# Patient Record
Sex: FEMALE | URBAN - METROPOLITAN AREA
[De-identification: names, ages, dates, MRNs, and addresses within clinical notes are randomized per-mention and may not be internally consistent; named-entity substitution may affect disease eponyms.]

---

## 2019-06-17 ENCOUNTER — APPOINTMENT (RX ONLY)
Dept: URBAN - METROPOLITAN AREA CLINIC 112 | Facility: CLINIC | Age: 29
Setting detail: DERMATOLOGY
End: 2019-06-17

## 2019-06-17 DIAGNOSIS — L70.0 ACNE VULGARIS: ICD-10-CM

## 2019-06-17 PROCEDURE — ? PRESCRIPTION

## 2019-06-17 PROCEDURE — ? COUNSELING

## 2019-06-17 PROCEDURE — ? TREATMENT REGIMEN

## 2019-06-17 PROCEDURE — 99202 OFFICE O/P NEW SF 15 MIN: CPT

## 2019-06-17 RX ORDER — MINOCYCLINE HYDROCHLORIDE 65 MG/1
TABLET, FILM COATED, EXTENDED RELEASE ORAL
Qty: 30 | Refills: 0 | Status: ERX | COMMUNITY
Start: 2019-06-17

## 2019-06-17 RX ADMIN — MINOCYCLINE HYDROCHLORIDE: 65 TABLET, FILM COATED, EXTENDED RELEASE ORAL at 00:00

## 2019-06-17 ASSESSMENT — LOCATION SIMPLE DESCRIPTION DERM: LOCATION SIMPLE: LEFT CHEEK

## 2019-06-17 ASSESSMENT — LOCATION DETAILED DESCRIPTION DERM: LOCATION DETAILED: LEFT INFERIOR MEDIAL MALAR CHEEK

## 2019-06-17 ASSESSMENT — LOCATION ZONE DERM: LOCATION ZONE: FACE

## 2019-06-17 NOTE — PROCEDURE: COUNSELING
Bactrim Counseling:  I discussed with the patient the risks of sulfa antibiotics including but not limited to GI upset, allergic reaction, drug rash, diarrhea, dizziness, photosensitivity, and yeast infections.  Rarely, more serious reactions can occur including but not limited to aplastic anemia, agranulocytosis, methemoglobinemia, blood dyscrasias, liver or kidney failure, lung infiltrates or desquamative/blistering drug rashes.
Minocycline Counseling: Patient advised regarding possible photosensitivity and discoloration of the teeth, skin, lips, tongue and gums.  Patient instructed to avoid sunlight, if possible.  When exposed to sunlight, patients should wear protective clothing, sunglasses, and sunscreen.  The patient was instructed to call the office immediately if the following severe adverse effects occur:  hearing changes, easy bruising/bleeding, severe headache, or vision changes.  The patient verbalized understanding of the proper use and possible adverse effects of minocycline.  All of the patient's questions and concerns were addressed.
Topical Sulfur Applications Counseling: Topical Sulfur Counseling: Patient counseled that this medication may cause skin irritation or allergic reactions.  In the event of skin irritation, the patient was advised to reduce the amount of the drug applied or use it less frequently.   The patient verbalized understanding of the proper use and possible adverse effects of topical sulfur application.  All of the patient's questions and concerns were addressed.
Erythromycin Pregnancy And Lactation Text: This medication is Pregnancy Category B and is considered safe during pregnancy. It is also excreted in breast milk.
Topical Retinoid Pregnancy And Lactation Text: This medication is Pregnancy Category C. It is unknown if this medication is excreted in breast milk.
Tetracycline Counseling: Patient counseled regarding possible photosensitivity and increased risk for sunburn.  Patient instructed to avoid sunlight, if possible.  When exposed to sunlight, patients should wear protective clothing, sunglasses, and sunscreen.  The patient was instructed to call the office immediately if the following severe adverse effects occur:  hearing changes, easy bruising/bleeding, severe headache, or vision changes.  The patient verbalized understanding of the proper use and possible adverse effects of tetracycline.  All of the patient's questions and concerns were addressed. Patient understands to avoid pregnancy while on therapy due to potential birth defects.
Isotretinoin Counseling: Patient should get monthly blood tests, not donate blood, not drive at night if vision affected, not share medication, and not undergo elective surgery for 6 months after tx completed. Side effects reviewed, pt to contact office should one occur.
Tazorac Counseling:  Patient advised that medication is irritating and drying.  Patient may need to apply sparingly and wash off after an hour before eventually leaving it on overnight.  The patient verbalized understanding of the proper use and possible adverse effects of tazorac.  All of the patient's questions and concerns were addressed.
Dapsone Pregnancy And Lactation Text: This medication is Pregnancy Category C and is not considered safe during pregnancy or breast feeding.
Tetracycline Pregnancy And Lactation Text: This medication is Pregnancy Category D and not consider safe during pregnancy. It is also excreted in breast milk.
Bactrim Pregnancy And Lactation Text: This medication is Pregnancy Category D and is known to cause fetal risk.  It is also excreted in breast milk.
Topical Sulfur Applications Pregnancy And Lactation Text: This medication is Pregnancy Category C and has an unknown safety profile during pregnancy. It is unknown if this topical medication is excreted in breast milk.
Birth Control Pills Counseling: Birth Control Pill Counseling: I discussed with the patient the potential side effects of OCPs including but not limited to increased risk of stroke, heart attack, thrombophlebitis, deep venous thrombosis, hepatic adenomas, breast changes, GI upset, headaches, and depression.  The patient verbalized understanding of the proper use and possible adverse effects of OCPs. All of the patient's questions and concerns were addressed.
Include Pregnancy/Lactation Warning?: No
Spironolactone Counseling: Patient advised regarding risks of diarrhea, abdominal pain, hyperkalemia, birth defects (for female patients), liver toxicity and renal toxicity. The patient may need blood work to monitor liver and kidney function and potassium levels while on therapy. The patient verbalized understanding of the proper use and possible adverse effects of spironolactone.  All of the patient's questions and concerns were addressed.
Isotretinoin Pregnancy And Lactation Text: This medication is Pregnancy Category X and is considered extremely dangerous during pregnancy. It is unknown if it is excreted in breast milk.
Tazorac Pregnancy And Lactation Text: This medication is not safe during pregnancy. It is unknown if this medication is excreted in breast milk.
Benzoyl Peroxide Counseling: Patient counseled that medicine may cause skin irritation and bleach clothing.  In the event of skin irritation, the patient was advised to reduce the amount of the drug applied or use it less frequently.   The patient verbalized understanding of the proper use and possible adverse effects of benzoyl peroxide.  All of the patient's questions and concerns were addressed.
Doxycycline Counseling:  Patient counseled regarding possible photosensitivity and increased risk for sunburn.  Patient instructed to avoid sunlight, if possible.  When exposed to sunlight, patients should wear protective clothing, sunglasses, and sunscreen.  The patient was instructed to call the office immediately if the following severe adverse effects occur:  hearing changes, easy bruising/bleeding, severe headache, or vision changes.  The patient verbalized understanding of the proper use and possible adverse effects of doxycycline.  All of the patient's questions and concerns were addressed.
Benzoyl Peroxide Pregnancy And Lactation Text: This medication is Pregnancy Category C. It is unknown if benzoyl peroxide is excreted in breast milk.
Spironolactone Pregnancy And Lactation Text: This medication can cause feminization of the male fetus and should be avoided during pregnancy. The active metabolite is also found in breast milk.
Birth Control Pills Pregnancy And Lactation Text: This medication should be avoided if pregnant and for the first 30 days post-partum.
Azithromycin Counseling:  I discussed with the patient the risks of azithromycin including but not limited to GI upset, allergic reaction, drug rash, diarrhea, and yeast infections.
High Dose Vitamin A Counseling: Side effects reviewed, pt to contact office should one occur.
Topical Clindamycin Counseling: Patient counseled that this medication may cause skin irritation or allergic reactions.  In the event of skin irritation, the patient was advised to reduce the amount of the drug applied or use it less frequently.   The patient verbalized understanding of the proper use and possible adverse effects of clindamycin.  All of the patient's questions and concerns were addressed.
Doxycycline Pregnancy And Lactation Text: This medication is Pregnancy Category D and not consider safe during pregnancy. It is also excreted in breast milk but is considered safe for shorter treatment courses.
High Dose Vitamin A Pregnancy And Lactation Text: High dose vitamin A therapy is contraindicated during pregnancy and breast feeding.
Azithromycin Pregnancy And Lactation Text: This medication is considered safe during pregnancy and is also secreted in breast milk.
Topical Clindamycin Pregnancy And Lactation Text: This medication is Pregnancy Category B and is considered safe during pregnancy. It is unknown if it is excreted in breast milk.
Erythromycin Counseling:  I discussed with the patient the risks of erythromycin including but not limited to GI upset, allergic reaction, drug rash, diarrhea, increase in liver enzymes, and yeast infections.
Topical Retinoid counseling:  Patient advised to apply a pea-sized amount only at bedtime and wait 30 minutes after washing their face before applying.  If too drying, patient may add a non-comedogenic moisturizer. The patient verbalized understanding of the proper use and possible adverse effects of retinoids.  All of the patient's questions and concerns were addressed.
Dapsone Counseling: I discussed with the patient the risks of dapsone including but not limited to hemolytic anemia, agranulocytosis, rashes, methemoglobinemia, kidney failure, peripheral neuropathy, headaches, GI upset, and liver toxicity.  Patients who start dapsone require monitoring including baseline LFTs and weekly CBCs for the first month, then every month thereafter.  The patient verbalized understanding of the proper use and possible adverse effects of dapsone.  All of the patient's questions and concerns were addressed.
Detail Level: Zone

## 2019-06-17 NOTE — PROCEDURE: TREATMENT REGIMEN
Action 2: Continue
Detail Level: Zone
Start Regimen: - Patient recommended to start her OCP the first Sunday of her period \\n\\nFor interim prior to Accutane start, patient is to begin:\\n- Solodyn 65 mg: Take 1 tablet PO QD with food
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  Patient elects to begin Accutane today. As she is not currently on a birth control pill, she cannot be registered today. She is to return for Accutane registration ([facility] visit) once she has restarted birth control

## 2019-06-17 NOTE — HPI: PIMPLES (ACNE)
How Severe Is Your Acne?: moderate
Is This A New Presentation, Or A Follow-Up?: Acne
What Type Of Note Output Would You Prefer (Optional)?: Bullet Format
Additional Comments (Use Complete Sentences): Patient reports she completed 2 rounds of Accutane. Her last course was 6 months long and occurred 7 years ago. Patient was prescribed a OCP but she has not started it yet

## 2019-06-21 ENCOUNTER — APPOINTMENT (RX ONLY)
Dept: URBAN - METROPOLITAN AREA CLINIC 112 | Facility: CLINIC | Age: 29
Setting detail: DERMATOLOGY
End: 2019-06-21

## 2019-06-21 DIAGNOSIS — L70.0 ACNE VULGARIS: ICD-10-CM

## 2019-06-21 PROCEDURE — ? COUNSELING

## 2019-06-21 PROCEDURE — ? ORDER TESTS

## 2019-06-21 PROCEDURE — ? ISOTRETINOIN INITIATION

## 2019-06-21 PROCEDURE — 81025 URINE PREGNANCY TEST: CPT

## 2019-06-21 PROCEDURE — ? URINE PREGNANCY TEST

## 2019-06-21 ASSESSMENT — LOCATION SIMPLE DESCRIPTION DERM: LOCATION SIMPLE: LEFT CHEEK

## 2019-06-21 ASSESSMENT — LOCATION ZONE DERM: LOCATION ZONE: FACE

## 2019-06-21 ASSESSMENT — LOCATION DETAILED DESCRIPTION DERM: LOCATION DETAILED: LEFT SUPERIOR CENTRAL MALAR CHEEK

## 2019-06-21 NOTE — PROCEDURE: ISOTRETINOIN INITIATION
Ipledge Number (Optional): [id number]
Detail Level: Zone
Comments: iPledge PowerPoint reviewed and consents signed today. Discussed 7 day prescription window and 19 day lockout if first window is missed. Reminded patient to fast 6-8 hours before lab draw and to have labs drawn 1-2 days before clinic. Lab slip given to patient today for follow up appt in 1 month.
Patient Reported Weight (Optional - Include Units): 69.1 kg

## 2019-06-21 NOTE — PROCEDURE: COUNSELING
Bactrim Pregnancy And Lactation Text: This medication is Pregnancy Category D and is known to cause fetal risk.  It is also excreted in breast milk.
Dapsone Pregnancy And Lactation Text: This medication is Pregnancy Category C and is not considered safe during pregnancy or breast feeding.
Detail Level: Zone
Topical Clindamycin Counseling: Patient counseled that this medication may cause skin irritation or allergic reactions.  In the event of skin irritation, the patient was advised to reduce the amount of the drug applied or use it less frequently.   The patient verbalized understanding of the proper use and possible adverse effects of clindamycin.  All of the patient's questions and concerns were addressed.
Erythromycin Pregnancy And Lactation Text: This medication is Pregnancy Category B and is considered safe during pregnancy. It is also excreted in breast milk.
Include Pregnancy/Lactation Warning?: No
High Dose Vitamin A Pregnancy And Lactation Text: High dose vitamin A therapy is contraindicated during pregnancy and breast feeding.
Bactrim Counseling:  I discussed with the patient the risks of sulfa antibiotics including but not limited to GI upset, allergic reaction, drug rash, diarrhea, dizziness, photosensitivity, and yeast infections.  Rarely, more serious reactions can occur including but not limited to aplastic anemia, agranulocytosis, methemoglobinemia, blood dyscrasias, liver or kidney failure, lung infiltrates or desquamative/blistering drug rashes.
Spironolactone Pregnancy And Lactation Text: This medication can cause feminization of the male fetus and should be avoided during pregnancy. The active metabolite is also found in breast milk.
Benzoyl Peroxide Pregnancy And Lactation Text: This medication is Pregnancy Category C. It is unknown if benzoyl peroxide is excreted in breast milk.
Topical Retinoid Pregnancy And Lactation Text: This medication is Pregnancy Category C. It is unknown if this medication is excreted in breast milk.
Doxycycline Counseling:  Patient counseled regarding possible photosensitivity and increased risk for sunburn.  Patient instructed to avoid sunlight, if possible.  When exposed to sunlight, patients should wear protective clothing, sunglasses, and sunscreen.  The patient was instructed to call the office immediately if the following severe adverse effects occur:  hearing changes, easy bruising/bleeding, severe headache, or vision changes.  The patient verbalized understanding of the proper use and possible adverse effects of doxycycline.  All of the patient's questions and concerns were addressed.
Topical Clindamycin Pregnancy And Lactation Text: This medication is Pregnancy Category B and is considered safe during pregnancy. It is unknown if it is excreted in breast milk.
Isotretinoin Counseling: Patient should get monthly blood tests, not donate blood, not drive at night if vision affected, not share medication, and not undergo elective surgery for 6 months after tx completed. Side effects reviewed, pt to contact office should one occur.
Birth Control Pills Counseling: Birth Control Pill Counseling: I discussed with the patient the potential side effects of OCPs including but not limited to increased risk of stroke, heart attack, thrombophlebitis, deep venous thrombosis, hepatic adenomas, breast changes, GI upset, headaches, and depression.  The patient verbalized understanding of the proper use and possible adverse effects of OCPs. All of the patient's questions and concerns were addressed.
Tetracycline Counseling: Patient counseled regarding possible photosensitivity and increased risk for sunburn.  Patient instructed to avoid sunlight, if possible.  When exposed to sunlight, patients should wear protective clothing, sunglasses, and sunscreen.  The patient was instructed to call the office immediately if the following severe adverse effects occur:  hearing changes, easy bruising/bleeding, severe headache, or vision changes.  The patient verbalized understanding of the proper use and possible adverse effects of tetracycline.  All of the patient's questions and concerns were addressed. Patient understands to avoid pregnancy while on therapy due to potential birth defects.
Topical Retinoid counseling:  Patient advised to apply a pea-sized amount only at bedtime and wait 30 minutes after washing their face before applying.  If too drying, patient may add a non-comedogenic moisturizer. The patient verbalized understanding of the proper use and possible adverse effects of retinoids.  All of the patient's questions and concerns were addressed.
Minocycline Counseling: Patient advised regarding possible photosensitivity and discoloration of the teeth, skin, lips, tongue and gums.  Patient instructed to avoid sunlight, if possible.  When exposed to sunlight, patients should wear protective clothing, sunglasses, and sunscreen.  The patient was instructed to call the office immediately if the following severe adverse effects occur:  hearing changes, easy bruising/bleeding, severe headache, or vision changes.  The patient verbalized understanding of the proper use and possible adverse effects of minocycline.  All of the patient's questions and concerns were addressed.
Topical Sulfur Applications Counseling: Topical Sulfur Counseling: Patient counseled that this medication may cause skin irritation or allergic reactions.  In the event of skin irritation, the patient was advised to reduce the amount of the drug applied or use it less frequently.   The patient verbalized understanding of the proper use and possible adverse effects of topical sulfur application.  All of the patient's questions and concerns were addressed.
Azithromycin Pregnancy And Lactation Text: This medication is considered safe during pregnancy and is also secreted in breast milk.
Birth Control Pills Pregnancy And Lactation Text: This medication should be avoided if pregnant and for the first 30 days post-partum.
Benzoyl Peroxide Counseling: Patient counseled that medicine may cause skin irritation and bleach clothing.  In the event of skin irritation, the patient was advised to reduce the amount of the drug applied or use it less frequently.   The patient verbalized understanding of the proper use and possible adverse effects of benzoyl peroxide.  All of the patient's questions and concerns were addressed.
Doxycycline Pregnancy And Lactation Text: This medication is Pregnancy Category D and not consider safe during pregnancy. It is also excreted in breast milk but is considered safe for shorter treatment courses.
Isotretinoin Pregnancy And Lactation Text: This medication is Pregnancy Category X and is considered extremely dangerous during pregnancy. It is unknown if it is excreted in breast milk.
Minocycline Pregnancy And Lactation Text: This medication is Pregnancy Category D and not consider safe during pregnancy. It is also excreted in breast milk.
Azithromycin Counseling:  I discussed with the patient the risks of azithromycin including but not limited to GI upset, allergic reaction, drug rash, diarrhea, and yeast infections.
Dapsone Counseling: I discussed with the patient the risks of dapsone including but not limited to hemolytic anemia, agranulocytosis, rashes, methemoglobinemia, kidney failure, peripheral neuropathy, headaches, GI upset, and liver toxicity.  Patients who start dapsone require monitoring including baseline LFTs and weekly CBCs for the first month, then every month thereafter.  The patient verbalized understanding of the proper use and possible adverse effects of dapsone.  All of the patient's questions and concerns were addressed.
Tazorac Pregnancy And Lactation Text: This medication is not safe during pregnancy. It is unknown if this medication is excreted in breast milk.
Topical Sulfur Applications Pregnancy And Lactation Text: This medication is Pregnancy Category C and has an unknown safety profile during pregnancy. It is unknown if this topical medication is excreted in breast milk.
High Dose Vitamin A Counseling: Side effects reviewed, pt to contact office should one occur.
Spironolactone Counseling: Patient advised regarding risks of diarrhea, abdominal pain, hyperkalemia, birth defects (for female patients), liver toxicity and renal toxicity. The patient may need blood work to monitor liver and kidney function and potassium levels while on therapy. The patient verbalized understanding of the proper use and possible adverse effects of spironolactone.  All of the patient's questions and concerns were addressed.
Erythromycin Counseling:  I discussed with the patient the risks of erythromycin including but not limited to GI upset, allergic reaction, drug rash, diarrhea, increase in liver enzymes, and yeast infections.
Tazorac Counseling:  Patient advised that medication is irritating and drying.  Patient may need to apply sparingly and wash off after an hour before eventually leaving it on overnight.  The patient verbalized understanding of the proper use and possible adverse effects of tazorac.  All of the patient's questions and concerns were addressed.

## 2019-06-21 NOTE — PROCEDURE: ORDER TESTS
Performing Laboratory: [id number]
Billing Type: Third-Party Bill
Expected Date Of Service: 06/21/2019
Bill For Surgical Tray: no

## 2019-06-21 NOTE — PROCEDURE: ORDER TESTS
Expected Date Of Service: 06/21/2019
Billing Type: Third-Party Bill
Performing Laboratory: [id number]
Bill For Surgical Tray: no

## 2019-06-21 NOTE — PROCEDURE: URINE PREGNANCY TEST
Expiration Date (Optional): 2020-09-30
Detail Level: None
Lot # (Optional): [id number]
Performed By: Danielle Carter MA
Urine Pregnancy Test Result: negative

## 2019-07-26 ENCOUNTER — APPOINTMENT (RX ONLY)
Dept: URBAN - METROPOLITAN AREA CLINIC 112 | Facility: CLINIC | Age: 29
Setting detail: DERMATOLOGY
End: 2019-07-26

## 2019-07-26 DIAGNOSIS — L70.0 ACNE VULGARIS: ICD-10-CM | Status: INADEQUATELY CONTROLLED

## 2019-07-26 PROCEDURE — ? ORDER TESTS

## 2019-07-26 PROCEDURE — ? ISOTRETINOIN INITIATION

## 2019-07-26 PROCEDURE — ? PRESCRIPTION

## 2019-07-26 PROCEDURE — 99213 OFFICE O/P EST LOW 20 MIN: CPT

## 2019-07-26 PROCEDURE — ? TREATMENT REGIMEN

## 2019-07-26 RX ORDER — ISOTRETINOIN 40 MG/1
1 CAPSULE, LIQUID FILLED ORAL QD
Qty: 30 | Refills: 0 | Status: ERX | COMMUNITY
Start: 2019-07-26

## 2019-07-26 RX ADMIN — ISOTRETINOIN 1: 40 CAPSULE, LIQUID FILLED ORAL at 00:00

## 2019-07-26 NOTE — PROCEDURE: TREATMENT REGIMEN
Action 4: Continue
Discontinue Regimen: Solodyn 65mg QD\\nAvita 0.025% cream
Detail Level: Zone
Start Regimen: Labs reviewed; unremarkable. \\n\\nPatient to begin Accutane 40mg QD.\\n\\nStanding order given to patient.

## 2019-07-26 NOTE — PROCEDURE: ISOTRETINOIN INITIATION
Detail Level: Zone
Ipledge Number (Optional): [id number]
Patient Reported Weight (Optional - Include Units): 69.1 kg

## 2019-07-26 NOTE — PROCEDURE: ORDER TESTS
Billing Type: Third-Party Bill
Performing Laboratory: [id number]
Expected Date Of Service: 07/26/2019
Bill For Surgical Tray: no

## 2019-08-29 ENCOUNTER — APPOINTMENT (RX ONLY)
Dept: URBAN - METROPOLITAN AREA CLINIC 112 | Facility: CLINIC | Age: 29
Setting detail: DERMATOLOGY
End: 2019-08-29

## 2019-08-29 DIAGNOSIS — L70.0 ACNE VULGARIS: ICD-10-CM

## 2019-08-29 PROCEDURE — 99213 OFFICE O/P EST LOW 20 MIN: CPT

## 2019-08-29 PROCEDURE — ? ISOTRETINOIN MONITORING

## 2019-08-29 PROCEDURE — ? PRESCRIPTION

## 2019-08-29 PROCEDURE — ? ORDER TESTS

## 2019-08-29 RX ORDER — ISOTRETINOIN 40 MG/1
CAPSULE, LIQUID FILLED ORAL
Qty: 30 | Refills: 0 | Status: ERX

## 2019-08-29 RX ORDER — ISOTRETINOIN 30 MG/1
CAPSULE, LIQUID FILLED ORAL
Qty: 30 | Refills: 0 | Status: ERX | COMMUNITY
Start: 2019-08-29

## 2019-08-29 RX ADMIN — ISOTRETINOIN: 30 CAPSULE, LIQUID FILLED ORAL at 00:00

## 2019-08-29 NOTE — PROCEDURE: ORDER TESTS
Performing Laboratory: [id number]
Expected Date Of Service: 07/26/2019
Bill For Surgical Tray: no
Billing Type: Third-Party Bill

## 2019-08-29 NOTE — HPI: MEDICATION (ISOTRETINOIN)
Is This A New Presentation, Or A Follow-Up?: Follow Up Isotretinoin
Additional History: Weight: 69.1 kg.\\nCumulative dose: 17 mg/kg
same

## 2019-08-29 NOTE — PROCEDURE: ISOTRETINOIN MONITORING
Detail Level: Zone
Upper Range (In Mg/Kg): 150
Female Pregnancy Counseling Text: Female patients should also be on two forms of birth control while taking this medication and for one month after their last dose.
Cheilitis Aggressive Treatment: I recommended application of Vaseline or Aquaphor numerous times a day (as often as every hour) and before going to bed. I also prescribed a topical steroid for twice daily use.
Hypertriglyceridemia Monitoring: I explained this is common when taking isotretinoin. If this worsens they will contact us.
Target Cumulative Dosage (In Mg/Kg): 135
Hypertriglyceridemia Treatment: I explained this is common when taking isotretinoin. If this worsens they will contact us. They may try OTC ibuprofen.
Hypercholesterolemia Monitoring: I explained this is common when taking isotretinoin. We will monitor closely.
What Is The Patient's Gender: Female
Pounds Preamble Statement (Weight Entered In Details Tab): Reported Weight in pounds:
Kilograms Preamble Statement (Weight Entered In Details Tab): Reported Weight in kilograms:
Next Month's Dosage: Continue Current Dosage
Completed Therapy?: No
Female Completion Statement: After discussing her treatment course we decided to discontinue isotretinoin therapy at this time. I explained that she would need to continue her birth control methods for at least one month after the last dosage. She should also get a pregnancy test one month after the last dose. She shouldn't donate blood for one month after the last dose. She should call with any new symptoms of depression.
Headache Monitoring: I recommended monitoring the headaches for now. There is no evidence of increased intracranial pressure. They were instructed to call if the headaches are worsening.
Male Completion Statement: After discussing his treatment course we decided to discontinue isotretinoin therapy at this time. He shouldn't donate blood for one month after the last dose. He should call with any new symptoms of depression.
Comments: Patient is tolerating medication well with no significant side effects. Reviewed unremarkable labs. Patient to increase to isotretinoin 70 mg daily.
Xerosis Normal Treatment: I recommended application of Cetaphil or CeraVe numerous times a day going to bed to all dry areas.
Are Labs Available For Review?: Yes
Xerosis Aggressive Treatment: I recommended application of Cetaphil or CeraVe numerous times a day going to bed to all dry areas. I also prescribed a topical steroid for twice daily use.
Retinoid Dermatitis Normal Treatment: I recommended more frequent application of Cetaphil or CeraVe to the areas of dermatitis.
Months Of Therapy Completed: 1
Retinoid Dermatitis Aggressive Treatment: I recommended more frequent application of Cetaphil or CeraVe to the areas of dermatitis. I also prescribed a topical steroid for twice daily use until the dermatitis resolves.
Ipledge Number (Optional): [id number]
Nosebleeds Normal Treatment: I explained this is common when taking isotretinoin. I recommended saline mist in each nostril multiple times a day. If this worsens they will contact us.
Weight Units: kilograms
Patient Weight (Optional But Required For Cumulative Dose-Numbers And Decimals Only): 69.1
Xerosis Normal Treatment: I recommended application of Cetaphil or CeraVe numerous times a day and before going to bed to all dry areas.
Dosing Month 1 (Required For Cumulative Dosing): 40mg Daily
Cheilitis Normal Treatment: I recommended application of Vaseline or Aquaphor numerous times a day (as often as every hour) and before going to bed.
Xerosis Aggressive Treatment: I recommended application of Cetaphil or CeraVe numerous times a day and before going to bed to all dry areas. I also prescribed a topical steroid for twice daily use.
Use Therapeutic Ranged Or Therapeutic Target: please select Range or Target
Lower Range (In Mg/Kg): 120
Counseling Text: I reviewed the side effect in detail. Patient should get monthly blood tests, not donate blood, not drive at night if vision affected, and not share medication.

## 2019-10-01 ENCOUNTER — APPOINTMENT (RX ONLY)
Dept: URBAN - METROPOLITAN AREA CLINIC 112 | Facility: CLINIC | Age: 29
Setting detail: DERMATOLOGY
End: 2019-10-01

## 2019-10-01 DIAGNOSIS — L70.0 ACNE VULGARIS: ICD-10-CM | Status: INADEQUATELY CONTROLLED

## 2019-10-01 PROCEDURE — ? ISOTRETINOIN MONITORING

## 2019-10-01 PROCEDURE — ? ORDER TESTS

## 2019-10-01 PROCEDURE — 99213 OFFICE O/P EST LOW 20 MIN: CPT

## 2019-10-01 RX ORDER — ISOTRETINOIN 40 MG/1
CAPSULE, LIQUID FILLED ORAL
Qty: 30 | Refills: 0 | Status: ERX

## 2019-10-01 RX ORDER — ISOTRETINOIN 30 MG/1
CAPSULE, LIQUID FILLED ORAL
Qty: 30 | Refills: 0 | Status: ERX

## 2019-10-01 NOTE — PROCEDURE: ISOTRETINOIN MONITORING
Xerosis Normal Treatment: I recommended application of Cetaphil or CeraVe numerous times a day going to bed to all dry areas.
Add Associated Diagnosis When Managing Medication Side Effects: Yes
Headache Monitoring: I recommended monitoring the headaches for now. There is no evidence of increased intracranial pressure. They were instructed to call if the headaches are worsening.
Pounds Preamble Statement (Weight Entered In Details Tab): Reported Weight in pounds:
Xerosis Aggressive Treatment: I recommended application of Cetaphil or CeraVe numerous times a day going to bed to all dry areas. I also prescribed a topical steroid for twice daily use.
Retinoid Dermatitis Aggressive Treatment: I recommended more frequent application of Cetaphil or CeraVe to the areas of dermatitis. I also prescribed a topical steroid for twice daily use until the dermatitis resolves.
Target Cumulative Dosage (In Mg/Kg): 135
Myalgia Monitoring: I explained this is common when taking isotretinoin. If this worsens they will contact us.
Next Month's Dosage: Continue Current Dosage
Myalgia Treatment: I explained this is common when taking isotretinoin. If this worsens they will contact us. They may try OTC ibuprofen.
Detail Level: Zone
Female Completion Statement: After discussing her treatment course we decided to discontinue isotretinoin therapy at this time. I explained that she would need to continue her birth control methods for at least one month after the last dosage. She should also get a pregnancy test one month after the last dose. She shouldn't donate blood for one month after the last dose. She should call with any new symptoms of depression.
Add High Risk Medication Management Associated Diagnosis?: No
Hypertriglyceridemia Monitoring: I explained this is common when taking isotretinoin. We will monitor closely.
Kilograms Preamble Statement (Weight Entered In Details Tab): Reported Weight in kilograms:
Lower Range (In Mg/Kg): 120
Xerosis Normal Treatment: I recommended application of Cetaphil or CeraVe numerous times a day and before going to bed to all dry areas.
Upper Range (In Mg/Kg): 150
Male Completion Statement: After discussing his treatment course we decided to discontinue isotretinoin therapy at this time. He shouldn't donate blood for one month after the last dose. He should call with any new symptoms of depression.
Use Therapeutic Ranged Or Therapeutic Target: please select Range or Target
Retinoid Dermatitis Normal Treatment: I recommended more frequent application of Cetaphil or CeraVe to the areas of dermatitis.
Nosebleeds Normal Treatment: I explained this is common when taking isotretinoin. I recommended saline mist in each nostril multiple times a day. If this worsens they will contact us.
Dosing Month 2 (Required For Cumulative Dosing): 35mg BID
Cheilitis Aggressive Treatment: I recommended application of Vaseline or Aquaphor numerous times a day (as often as every hour) and before going to bed. I also prescribed a topical steroid for twice daily use.
What Is The Patient's Gender: Female
Ipledge Number (Optional): [id number]
Female Pregnancy Counseling Text: Female patients should also be on two forms of birth control while taking this medication and for one month after their last dose.
Any Nosebleeds?: Yes - Normal Treatment
Cheilitis Normal Treatment: I recommended application of Vaseline or Aquaphor numerous times a day (as often as every hour) and before going to bed.
Patient Weight (Optional But Required For Cumulative Dose-Numbers And Decimals Only): 69.1
Weight Units: kilograms
Comments: Patient is tolerating medication well with no significant side effects. Patient to continue isotretinoin 70 mg daily.\\n-\\nPatient notes that she will be moving in December. Patient instructed to provide us with her new dermatologist’s contact information as soon as she chooses one.
Months Of Therapy Completed: 2
Xerosis Aggressive Treatment: I recommended application of Cetaphil or CeraVe numerous times a day and before going to bed to all dry areas. I also prescribed a topical steroid for twice daily use.
Counseling Text: I reviewed the side effect in detail. Patient should get monthly blood tests, not donate blood, not drive at night if vision affected, and not share medication.
Dosing Month 1 (Required For Cumulative Dosing): 40mg Daily

## 2019-10-01 NOTE — HPI: MEDICATION (ISOTRETINOIN)
Retired
How Severe Is It?: moderate
Is This A New Presentation, Or A Follow-Up?: Follow Up Isotretinoin
Additional History: Weight: 69.1kg, cumulative dose: 48mg/kg
Females Only: When Was Your Last Menstrual Period?: 8/18/2019

## 2019-10-01 NOTE — PROCEDURE: ORDER TESTS
Performing Laboratory: [id number]
Expected Date Of Service: 07/26/2019
Bill For Surgical Tray: no
Billing Type: Third-Party Bill

## 2019-11-01 ENCOUNTER — APPOINTMENT (RX ONLY)
Dept: URBAN - METROPOLITAN AREA CLINIC 112 | Facility: CLINIC | Age: 29
Setting detail: DERMATOLOGY
End: 2019-11-01

## 2019-11-01 DIAGNOSIS — L70.0 ACNE VULGARIS: ICD-10-CM

## 2019-11-01 PROCEDURE — ? ORDER TESTS

## 2019-11-01 PROCEDURE — ? ISOTRETINOIN MONITORING

## 2019-11-01 PROCEDURE — 99213 OFFICE O/P EST LOW 20 MIN: CPT

## 2019-11-01 PROCEDURE — ? PRESCRIPTION

## 2019-11-01 RX ORDER — ISOTRETINOIN 30 MG/1
CAPSULE, LIQUID FILLED ORAL QD
Qty: 90 | Refills: 0 | Status: ERX

## 2019-11-01 NOTE — PROCEDURE: ORDER TESTS
Billing Type: Third-Party Bill
Bill For Surgical Tray: no
Expected Date Of Service: 11/01/2019
Performing Laboratory: [id number]

## 2019-11-01 NOTE — PROCEDURE: ISOTRETINOIN MONITORING
Dosing Month 3 (Required For Cumulative Dosing): 35mg BID
Headache Monitoring: I recommended monitoring the headaches for now. There is no evidence of increased intracranial pressure. They were instructed to call if the headaches are worsening.
Use Enhanced Counseling Feature (Automatic): No
Hypertriglyceridemia Monitoring: I explained this is common when taking isotretinoin. If this worsens they will contact us.
Months Of Therapy Completed: 2
Xerosis Normal Treatment: I recommended application of Cetaphil or CeraVe numerous times a day going to bed to all dry areas.
Kilograms Preamble Statement (Weight Entered In Details Tab): Reported Weight in kilograms:
Target Cumulative Dosage (In Mg/Kg): 135
Myalgia Treatment: I explained this is common when taking isotretinoin. If this worsens they will contact us. They may try OTC ibuprofen.
Use Therapeutic Ranged Or Therapeutic Target: please select Range or Target
Retinoid Dermatitis Normal Treatment: I recommended more frequent application of Cetaphil or CeraVe to the areas of dermatitis.
Display Individual Monthly Dosage In The Note (If Yes Will Display All Dosages Which Are Not N/A): yes
Hypercholesterolemia Monitoring: I explained this is common when taking isotretinoin. We will monitor closely.
Dosing Month 1 (Required For Cumulative Dosing): 40mg Daily
What Is The Patient's Gender: Female
Patient Weight (Optional But Required For Cumulative Dose-Numbers And Decimals Only): 69.1
Ipledge Number (Optional): [id number]
Pounds Preamble Statement (Weight Entered In Details Tab): Reported Weight in pounds:
Male Completion Statement: After discussing his treatment course we decided to discontinue isotretinoin therapy at this time. He shouldn't donate blood for one month after the last dose. He should call with any new symptoms of depression.
Comments: Patient is tolerating medication well with no significant side effects. Patient to increase dosage to 90 mg. \\n-\\nPatient notes that she will be moving in December. Patient instructed to provide us with her new dermatologist’s contact information as soon as she chooses one.
Nosebleeds Normal Treatment: I explained this is common when taking isotretinoin. I recommended saline mist in each nostril multiple times a day. If this worsens they will contact us.
Cheilitis Normal Treatment: I recommended application of Vaseline or Aquaphor numerous times a day (as often as every hour) and before going to bed.
Cheilitis Aggressive Treatment: I recommended application of Vaseline or Aquaphor numerous times a day (as often as every hour) and before going to bed. I also prescribed a topical steroid for twice daily use.
Next Month's Dosage: Continue Current Dosage
Xerosis Aggressive Treatment: I recommended application of Cetaphil or CeraVe numerous times a day and before going to bed to all dry areas. I also prescribed a topical steroid for twice daily use.
Counseling Text: I reviewed the side effect in detail. Patient should get monthly blood tests, not donate blood, not drive at night if vision affected, and not share medication.
Retinoid Dermatitis Aggressive Treatment: I recommended more frequent application of Cetaphil or CeraVe to the areas of dermatitis. I also prescribed a topical steroid for twice daily use until the dermatitis resolves.
Weight Units: kilograms
Upper Range (In Mg/Kg): 150
Female Completion Statement: After discussing her treatment course we decided to discontinue isotretinoin therapy at this time. I explained that she would need to continue her birth control methods for at least one month after the last dosage. She should also get a pregnancy test one month after the last dose. She shouldn't donate blood for one month after the last dose. She should call with any new symptoms of depression.
Any Nosebleeds?: Yes - Normal Treatment
Xerosis Aggressive Treatment: I recommended application of Cetaphil or CeraVe numerous times a day going to bed to all dry areas. I also prescribed a topical steroid for twice daily use.
Xerosis Normal Treatment: I recommended application of Cetaphil or CeraVe numerous times a day and before going to bed to all dry areas.
Female Pregnancy Counseling Text: Female patients should also be on two forms of birth control while taking this medication and for one month after their last dose.
Detail Level: Zone
Lower Range (In Mg/Kg): 120

## 2019-11-01 NOTE — HPI: MEDICATION (ISOTRETINOIN)
Is This A New Presentation, Or A Follow-Up?: Follow Up Isotretinoin
Additional History: Labs received and pending review. \\n\\nWt:69.1; cumulative dosage: 78 mg/kg

## 2019-12-04 ENCOUNTER — APPOINTMENT (RX ONLY)
Dept: URBAN - METROPOLITAN AREA CLINIC 112 | Facility: CLINIC | Age: 29
Setting detail: DERMATOLOGY
End: 2019-12-04

## 2019-12-04 ENCOUNTER — RX ONLY (OUTPATIENT)
Age: 29
Setting detail: RX ONLY
End: 2019-12-04

## 2019-12-04 DIAGNOSIS — L70.0 ACNE VULGARIS: ICD-10-CM | Status: IMPROVED

## 2019-12-04 PROCEDURE — 99213 OFFICE O/P EST LOW 20 MIN: CPT

## 2019-12-04 PROCEDURE — ? ORDER TESTS

## 2019-12-04 PROCEDURE — ? ISOTRETINOIN MONITORING

## 2019-12-04 NOTE — PROCEDURE: ISOTRETINOIN MONITORING
Xerosis Aggressive Treatment: I recommended application of Cetaphil or CeraVe numerous times a day going to bed to all dry areas. I also prescribed a topical steroid for twice daily use.
Kilograms Preamble Statement (Weight Entered In Details Tab): Reported Weight in kilograms:
Hypertriglyceridemia Monitoring: I explained this is common when taking isotretinoin. If this worsens they will contact us.
Hypertriglyceridemia Treatment: I explained this is common when taking isotretinoin. If this worsens they will contact us. They may try OTC ibuprofen.
Ipledge Number (Optional): [id number]
Show How Many Months Of Anticipated Therapy Are Left: No
Pounds Preamble Statement (Weight Entered In Details Tab): Reported Weight in pounds:
Xerosis Aggressive Treatment: I recommended application of Cetaphil or CeraVe numerous times a day and before going to bed to all dry areas. I also prescribed a topical steroid for twice daily use.
Hypercholesterolemia Monitoring: I explained this is common when taking isotretinoin. We will monitor closely.
Months Of Therapy Completed: 4
Are Labs Available For Review?: No- Pending
Female Completion Statement: After discussing her treatment course we decided to discontinue isotretinoin therapy at this time. I explained that she would need to continue her birth control methods for at least one month after the last dosage. She should also get a pregnancy test one month after the last dose. She shouldn't donate blood for one month after the last dose. She should call with any new symptoms of depression.
Detail Level: Zone
Male Completion Statement: After discussing his treatment course we decided to discontinue isotretinoin therapy at this time. He shouldn't donate blood for one month after the last dose. He should call with any new symptoms of depression.
Any Myalgia?: Yes - Treatment
Comments: Patient is tolerating medication well, but she notes some mild muscle aches. She elects to continue taking isotretinoin 90 mg PO daily.\\n-\\nPatient notes that she will be moving next month. Patient instructed to provide us with her new dermatologist’s contact information as soon as she chooses one, so we can transfer care.
Female Pregnancy Counseling Text: Female patients should also be on two forms of birth control while taking this medication and for one month after their last dose.
Retinoid Dermatitis Aggressive Treatment: I recommended more frequent application of Cetaphil or CeraVe to the areas of dermatitis. I also prescribed a topical steroid for twice daily use until the dermatitis resolves.
Target Cumulative Dosage (In Mg/Kg): 135
Retinoid Dermatitis Normal Treatment: I recommended more frequent application of Cetaphil or CeraVe to the areas of dermatitis.
Dosing Month 1 (Required For Cumulative Dosing): 40mg Daily
Dosing Month 2 (Required For Cumulative Dosing): 35mg BID
Nosebleeds Normal Treatment: I explained this is common when taking isotretinoin. I recommended saline mist in each nostril multiple times a day. If this worsens they will contact us.
Weight Units: kilograms
Xerosis Normal Treatment: I recommended application of Cetaphil or CeraVe numerous times a day going to bed to all dry areas.
Display Individual Monthly Dosage In The Note (If Yes Will Display All Dosages Which Are Not N/A): yes
What Is The Patient's Gender: Female
Headache Monitoring: I recommended monitoring the headaches for now. There is no evidence of increased intracranial pressure. They were instructed to call if the headaches are worsening.
Cheilitis Normal Treatment: I recommended application of Vaseline or Aquaphor numerous times a day (as often as every hour) and before going to bed.
Upper Range (In Mg/Kg): 150
Lower Range (In Mg/Kg): 120
Dosing Month 4 (Required For Cumulative Dosing): 90mg Daily
Cheilitis Aggressive Treatment: I recommended application of Vaseline or Aquaphor numerous times a day (as often as every hour) and before going to bed. I also prescribed a topical steroid for twice daily use.
Xerosis Normal Treatment: I recommended application of Cetaphil or CeraVe numerous times a day and before going to bed to all dry areas.
Patient Weight (Optional But Required For Cumulative Dose-Numbers And Decimals Only): 69.1
Any Nosebleeds?: Yes - Normal Treatment
Next Month's Dosage: Continue Current Dosage
Counseling Text: I reviewed the side effect in detail. Patient should get monthly blood tests, not donate blood, not drive at night if vision affected, and not share medication.
Use Therapeutic Ranged Or Therapeutic Target: please select Range or Target

## 2019-12-04 NOTE — HPI: MEDICATION (ISOTRETINOIN)
How Severe Is It?: moderate
Is This A New Presentation, Or A Follow-Up?: Follow Up Isotretinoin
Additional History: Patient had her labs drawn on Dec. 3rd.   Wt: 69.1

## 2019-12-04 NOTE — PROCEDURE: ORDER TESTS
Expected Date Of Service: 12/04/2019
Bill For Surgical Tray: no
Billing Type: Third-Party Bill
Performing Laboratory: [id number]

## 2020-01-06 ENCOUNTER — APPOINTMENT (RX ONLY)
Dept: URBAN - METROPOLITAN AREA CLINIC 112 | Facility: CLINIC | Age: 30
Setting detail: DERMATOLOGY
End: 2020-01-06

## 2020-01-06 DIAGNOSIS — L70.0 ACNE VULGARIS: ICD-10-CM

## 2020-01-06 DIAGNOSIS — Z79.899 OTHER LONG TERM (CURRENT) DRUG THERAPY: ICD-10-CM

## 2020-01-06 PROCEDURE — ? ISOTRETINOIN MONITORING

## 2020-01-06 PROCEDURE — ? ORDER TESTS

## 2020-01-06 PROCEDURE — ? PRESCRIPTION

## 2020-01-06 PROCEDURE — 99213 OFFICE O/P EST LOW 20 MIN: CPT

## 2020-01-06 NOTE — HPI: MEDICATION (ISOTRETINOIN)
Is This A New Presentation, Or A Follow-Up?: Follow Up Isotretinoin
Additional History: Patient c/o muscle aches in the lower back and hips that has worsened since her LOV but says that it is tolerable. She did not have any acne break outs this past months.\\n\\nWeight: 69.1kg Cumulative dose: 156mg/kg

## 2020-01-06 NOTE — PROCEDURE: ORDER TESTS
Expected Date Of Service: 12/04/2019
Billing Type: Third-Party Bill
Bill For Surgical Tray: no
Performing Laboratory: [id number]

## 2020-01-06 NOTE — PROCEDURE: ISOTRETINOIN MONITORING
Ipledge Number (Optional): [id number]
Cheilitis Normal Treatment: I recommended application of Vaseline or Aquaphor numerous times a day (as often as every hour) and before going to bed.
Show Text Field For Brand Names Of Contraception?: No
Dosing Month 5 (Required For Cumulative Dosing): 90mg Daily
Female Pregnancy Counseling Text: Female patients should also be on two forms of birth control while taking this medication and for one month after their last dose.
What Is The Patient's Gender: Female
Cheilitis Aggressive Treatment: I recommended application of Vaseline or Aquaphor numerous times a day (as often as every hour) and before going to bed. I also prescribed a topical steroid for twice daily use.
Myalgia Monitoring: I explained this is common when taking isotretinoin. If this worsens they will contact us.
Nosebleeds Normal Treatment: I explained this is common when taking isotretinoin. I recommended saline mist in each nostril multiple times a day. If this worsens they will contact us.
Dosing Month 1 (Required For Cumulative Dosing): 40mg Daily
Hypertriglyceridemia Treatment: I explained this is common when taking isotretinoin. If this worsens they will contact us. They may try OTC ibuprofen.
Dosing Month 3 (Required For Cumulative Dosing): 35mg BID
Patient Weight (Optional But Required For Cumulative Dose-Numbers And Decimals Only): 69.1
Months Of Therapy Completed: 5
Use Therapeutic Ranged Or Therapeutic Target: please select Range or Target
Retinoid Dermatitis Normal Treatment: I recommended more frequent application of Cetaphil or CeraVe to the areas of dermatitis.
Detail Level: Zone
Upper Range (In Mg/Kg): 150
Hypercholesterolemia Monitoring: I explained this is common when taking isotretinoin. We will monitor closely.
Xerosis Normal Treatment: I recommended application of Cetaphil or CeraVe numerous times a day going to bed to all dry areas.
Female Completion Statement: After discussing her treatment course we decided to discontinue isotretinoin therapy at this time. I explained that she would need to continue her birth control methods for at least one month after the last dosage. She should also get a pregnancy test one month after the last dose. She shouldn't donate blood for one month after the last dose. She should call with any new symptoms of depression.
Retinoid Dermatitis Aggressive Treatment: I recommended more frequent application of Cetaphil or CeraVe to the areas of dermatitis. I also prescribed a topical steroid for twice daily use until the dermatitis resolves.
Xerosis Aggressive Treatment: I recommended application of Cetaphil or CeraVe numerous times a day and before going to bed to all dry areas. I also prescribed a topical steroid for twice daily use.
Next Month's Dosage: Continue Current Dosage
Counseling Text: I reviewed the side effect in detail. Patient should get monthly blood tests, not donate blood, not drive at night if vision affected, and not share medication.
Lower Range (In Mg/Kg): 120
Kilograms Preamble Statement (Weight Entered In Details Tab): Reported Weight in kilograms:
Headache Monitoring: I recommended monitoring the headaches for now. There is no evidence of increased intracranial pressure. They were instructed to call if the headaches are worsening.
Add Associated Diagnosis When Managing Medication Side Effects: Yes
Xerosis Aggressive Treatment: I recommended application of Cetaphil or CeraVe numerous times a day going to bed to all dry areas. I also prescribed a topical steroid for twice daily use.
Xerosis Normal Treatment: I recommended application of Cetaphil or CeraVe numerous times a day and before going to bed to all dry areas.
Male Completion Statement: After discussing his treatment course we decided to discontinue isotretinoin therapy at this time. He shouldn't donate blood for one month after the last dose. He should call with any new symptoms of depression.
Weight Units: kilograms
Any Myalgia?: Yes - Treatment
Are Labs Available For Review?: No- Pending
Pounds Preamble Statement (Weight Entered In Details Tab): Reported Weight in pounds:
Target Cumulative Dosage (In Mg/Kg): 135
Comments: Patient is tolerating medication well, but she notes some mild muscle aches. She elects to decrease to isotretinoin 80 mg for her last month of Accutane.\\n-\\n\\nPatient is moving and will not return to clinic for a post accutane office visit. I reviewed post Accutane skin care and expectations. Patient instructed to have HCG test drawn after her last dose of Accutane and again in 31 days. Patients triglycerides and liver enzymes have been stable for the duration of her Accutane treatment thus far, only 2 post therapy HCG lab test required. Patient provided with two lab order forms at today's visit.

## 2020-01-06 NOTE — PROCEDURE: ORDER TESTS
Billing Type: Third-Party Bill
Performing Laboratory: [id number]
Bill For Surgical Tray: no
Expected Date Of Service: 01/06/2020